# Patient Record
(demographics unavailable — no encounter records)

---

## 2024-12-19 NOTE — HISTORY OF PRESENT ILLNESS
[FreeTextEntry1] : Ms. BOOTHE 21 year old female presents for a new patient evaluation for headaches.  Endorses she has ha ongoing since about September 2024. She says headaches are constant and daily. She describes them as a dull pain radiating to different parts of her head. Denies visual symptoms. Denies migraine features. Has tried tylenol/motrin w/o relief. She denies debilitating headache. Headaches get worse throughout the day and vary from 6/10, to 8/10. Pain is worse w laying down. Also endorses dizziness starting the last few weeks, worse w head movements/laying down w associated nausea, Dizziness is occurring every other night, lasting 1-2 hours not room spinning just off balance.

## 2024-12-19 NOTE — ASSESSMENT
[FreeTextEntry1] : Ms. BOOTHE 21 year old female presents for a new patient evaluation for headache.  HA started around Sept 2024, not clear migraine features, has associated dizziness which started a few weeks ago.  Will obtain MR head w/o to r/o central etiology and advised to see ophthalmology for VFT/r/o papilledema.   -MR head w/o  -Ophthalmology referral  -Amitriptyline 10 mg QHS; avoid propranolol due to low hr, and unable to give topamax as per pt unclear if hx of kidney stones on prior testing  -Return 3 mos

## 2024-12-19 NOTE — PHYSICAL EXAM
[FreeTextEntry1] : Mental status: Awake, alert and oriented x3.  Recent and remote memory intact.  Naming, repetition and comprehension intact.  Attention/concentration intact.  No dysarthria, no aphasia.    Cranial nerves: Pupils equally round and reactive to light, visual fields full, no nystagmus, extraocular muscles intact, V1 through V3 intact bilaterally and symmetric, face symmetric, hearing intact to finger rub, palate elevation symmetric, tongue was midline.  Motor:  MRC grading 5/5 b/l UE/LE.   strength 5/5.  Normal tone and bulk.  No abnormal movements.   Sensation: Intact to light touch, temperature, vibration, and pinprick.  Coordination: No dysmetria on finger-to-nose   Reflexes: 2+ in bilateral UE/LE  Gait: Narrow and steady. No ataxia.